# Patient Record
Sex: MALE | Race: WHITE | NOT HISPANIC OR LATINO | Employment: FULL TIME | URBAN - METROPOLITAN AREA
[De-identification: names, ages, dates, MRNs, and addresses within clinical notes are randomized per-mention and may not be internally consistent; named-entity substitution may affect disease eponyms.]

---

## 2018-08-18 ENCOUNTER — APPOINTMENT (OUTPATIENT)
Dept: RADIOLOGY | Facility: MEDICAL CENTER | Age: 48
End: 2018-08-18
Attending: EMERGENCY MEDICINE
Payer: MEDICAID

## 2018-08-18 ENCOUNTER — HOSPITAL ENCOUNTER (EMERGENCY)
Facility: MEDICAL CENTER | Age: 48
End: 2018-08-18
Attending: EMERGENCY MEDICINE
Payer: MEDICAID

## 2018-08-18 VITALS
RESPIRATION RATE: 16 BRPM | HEIGHT: 65 IN | BODY MASS INDEX: 21.6 KG/M2 | OXYGEN SATURATION: 96 % | SYSTOLIC BLOOD PRESSURE: 120 MMHG | DIASTOLIC BLOOD PRESSURE: 77 MMHG | TEMPERATURE: 98.2 F | WEIGHT: 129.63 LBS | HEART RATE: 103 BPM

## 2018-08-18 DIAGNOSIS — S43.102A AC SEPARATION, LEFT, INITIAL ENCOUNTER: ICD-10-CM

## 2018-08-18 DIAGNOSIS — S20.211A CONTUSION OF RIGHT CHEST WALL, INITIAL ENCOUNTER: ICD-10-CM

## 2018-08-18 PROCEDURE — 99283 EMERGENCY DEPT VISIT LOW MDM: CPT

## 2018-08-18 PROCEDURE — 71250 CT THORAX DX C-: CPT

## 2018-08-18 PROCEDURE — 73000 X-RAY EXAM OF COLLAR BONE: CPT | Mod: LT

## 2018-08-18 ASSESSMENT — PAIN SCALES - GENERAL
PAINLEVEL_OUTOF10: 10
PAINLEVEL_OUTOF10: 10

## 2018-08-19 NOTE — DISCHARGE INSTRUCTIONS
"Acromioclavicular Injuries  The AC (acromioclavicular) joint is the joint in the shoulder where the collarbone (clavicle) meets the shoulder blade (scapula). The part of the shoulder blade connected to the collarbone is called the acromion. Common problems with and treatments for the AC joint are detailed below.  ARTHRITIS  Arthritis occurs when the joint has been injured and the smooth padding between the joints (cartilage) is lost. This is the wear and tear seen in most joints of the body if they have been overused. This causes the joint to produce pain and swelling which is worse with activity.   AC JOINT SEPARATION  AC joint separation means that the ligaments connecting the acromion of the shoulder blade and collarbone have been damaged, and the two bones no longer line up. AC separations can be anywhere from mild to severe, and are \"graded\" depending upon which ligaments are torn and how badly they are torn.  · Grade I Injury: the least damage is done, and the AC joint still lines up.  · Grade II Injury: damage to the ligaments which reinforce the AC joint. In a Grade II injury, these ligaments are stretched but not entirely torn. When stressed, the AC joint becomes painful and unstable.  · Grade III Injury: AC and secondary ligaments are completely torn, and the collarbone is no longer attached to the shoulder blade. This results in deformity; a prominence of the end of the clavicle.  AC JOINT FRACTURE  AC joint fracture means that there has been a break in the bones of the AC joint, usually the end of the clavicle.  TREATMENT  TREATMENT OF AC ARTHRITIS  · There is currently no way to replace the cartilage damaged by arthritis. The best way to improve the condition is to decrease the activities which aggravate the problem. Application of ice to the joint helps decrease pain and soreness (inflammation). The use of non-steroidal anti-inflammatory medication is helpful.  · If less conservative measures do not " "work, then cortisone shots (injections) may be used. These are anti-inflammatories; they decrease the soreness in the joint and swelling.  · If non-surgical measures fail, surgery may be recommended. The procedure is generally removal of a portion of the end of the clavicle. This is the part of the collarbone closest to your acromion which is stabilized with ligaments to the acromion of the shoulder blade. This surgery may be performed using a tube-like instrument with a light (arthroscope) for looking into a joint. It may also be performed as an open surgery through a small incision by the surgeon. Most patients will have good range of motion within 6 weeks and may return to all activity including sports by 8-12 weeks, barring complications.  TREATMENT OF AN AC SEPARATION  · The initial treatment is to decrease pain. This is best accomplished by immobilizing the arm in a sling and placing an ice pack to the shoulder for 20 to 30 minutes every 2 hours as needed. As the pain starts to subside, it is important to begin moving the fingers, wrist, elbow and eventually the shoulder in order to prevent a stiff or \"frozen\" shoulder. Instruction on when and how much to move the shoulder will be provided by your caregiver. The length of time needed to regain full motion and function depends on the amount or grade of the injury. Recovery from a Grade I AC separation usually takes 10 to 14 days, whereas a Grade III may take 6 to 8 weeks.  · Grade I and II separations usually do not require surgery. Even Grade III injuries usually allow return to full activity with few restrictions. Treatment is also based on the activity demands of the injured shoulder. For example, a high level quarterback with an injured throwing arm will receive more aggressive treatment than someone with a desk job who rarely uses his/her arm for strenuous activities. In some cases, a painful lump may persist which could require a later surgery. Surgery " can be very successful, but the benefits must be weighed against the potential risks.  TREATMENT OF AN AC JOINT FRACTURE  Fracture treatment depends on the type of fracture. Sometimes a splint or sling may be all that is required. Other times surgery may be required for repair. This is more frequently the case when the ligaments supporting the clavicle are completely torn. Your caregiver will help you with these decisions and together you can decide what will be the best treatment.  HOME CARE INSTRUCTIONS   · Apply ice to the injury for 15-20 minutes each hour while awake for 2 days. Put the ice in a plastic bag and place a towel between the bag of ice and skin.  · If a sling has been applied, wear it constantly for as long as directed by your caregiver, even at night. The sling or splint can be removed for bathing or showering or as directed. Be sure to keep the shoulder in the same place as when the sling is on. Do not lift the arm.  · If a figure-of-eight splint has been applied it should be tightened gently by another person every day. Tighten it enough to keep the shoulders held back. Allow enough room to place the index finger between the body and strap. Loosen the splint immediately if there is numbness or tingling in the hands.  · Take over-the-counter or prescription medicines for pain, discomfort or fever as directed by your caregiver.  · If you or your child has received a follow up appointment, it is very important to keep that appointment in order to avoid long term complications, chronic pain or disability.  SEEK MEDICAL CARE IF:   · The pain is not relieved with medications.  · There is increased swelling or discoloration that continues to get worse rather than better.  · You or your child has been unable to follow up as instructed.  · There is progressive numbness and tingling in the arm, forearm or hand.  SEEK IMMEDIATE MEDICAL CARE IF:   · The arm is numb, cold or pale.  · There is increasing pain  in the hand, forearm or fingers.  MAKE SURE YOU:   · Understand these instructions.  · Will watch your condition.  · Will get help right away if you are not doing well or get worse.     This information is not intended to replace advice given to you by your health care provider. Make sure you discuss any questions you have with your health care provider.     Document Released: 09/27/2006 Document Revised: 03/11/2013 Document Reviewed: 03/22/2010  [employer] Interactive Patient Education ©2016 [employer]      Chest Contusion, Adult  A chest contusion is a deep bruise to the chest. Contusions are usually the result of a blunt injury to tissues under the skin. The injury can damage the small blood vessels under the skin, which causes bleeding under the skin. The skin overlying the contusion may turn blue, purple, or yellow. Minor injuries may give you a painless contusion, but more severe contusions may stay painful and swollen for a few weeks.  What are the causes?  A contusion is usually caused by a hard hit (blow), trauma, or direct force to your chest, such as:  · A motor vehicle accident.  · Falls.  · Bicycle injuries.  · Contact sport injuries.  What increases the risk?  You may be at a higher risk for a chest contusion if you play a sport in which falls and contact are common, such as football or soccer.  What are the signs or symptoms?  Symptoms of this condition include:  · Chest swelling.  · Pain and tenderness of the chest.  · Discomfort with certain movements of the upper torso.  · Discoloration of the chest. The area may have redness and then turn blue, purple, or yellow.  · Discomfort when taking deep breaths.  How is this diagnosed?  A chest contusion is diagnosed from a physical exam and your medical history. An X-ray may be needed to determine if there were any associated injuries, such as broken bones (fractures). Sometimes other tests such as CT scans, ultrasounds, or MRIs may be needed if internal  injuries are suspected.  A test that shows the amount of oxygen in your blood (pulse oximetry) may be done if you have trouble breathing.  How is this treated?  Often, the best treatment for a chest contusion is resting and applying ice to the injured area. Deep-breathing exercises may be recommended to reduce the risk of pneumonia. Oxygen therapy may be given if you have trouble breathing or have low oxygen levels. Over-the-counter medicines may also be recommended for pain control.  Follow these instructions at home:  · If directed, apply ice to the injured area.  ¨ Put ice in a plastic bag.  ¨ Place a towel between your skin and the bag.  ¨ Leave the ice on for 20 minutes, 2-3 times per day.  · Take over-the-counter and prescription medicines only as told by your health care provider.  · Do any deep-breathing exercises as told by your health care provider, if this applies.  · Do not lie down flat on your back. Keep your head and chest raised (elevated) when you are resting or sleeping.  · Do not use any products that contain nicotine or tobacco, such as cigarettes and e-cigarettes. If you need help quitting, ask your health care provider.  · Do not lift anything that causes you discomfort or pain.  Contact a health care provider if:  · Your swelling or pain is not relieved with medicines or treatment.  · You have increased bruising or swelling.  · You have pain that is getting worse.  · Your symptoms have not improved after one week.  Get help right away if:  · You have a sudden, significant increase in pain.  · You have difficulty breathing.  · You have dizziness, weakness, or fainting.  · You have blood in your urine or stool.  · You cough up blood or you vomit blood.  Summary  · A chest contusion is a deep bruise to the chest that is usually caused by a hard hit, trauma, or direct force to your chest.  · Treatment for a chest contusion may include resting and applying ice to the injured area.  · Contact a  health care provider if you have problems breathing or if your pain does not improve with treatment.  This information is not intended to replace advice given to you by your health care provider. Make sure you discuss any questions you have with your health care provider.  Document Released: 09/12/2002 Document Revised: 09/14/2017 Document Reviewed: 09/14/2017  [employer] Interactive Patient Education © 2017 [employer]
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
## 2018-12-12 ENCOUNTER — HOSPITAL ENCOUNTER (EMERGENCY)
Facility: MEDICAL CENTER | Age: 48
End: 2018-12-12
Attending: EMERGENCY MEDICINE
Payer: MEDICAID

## 2018-12-12 VITALS
HEART RATE: 96 BPM | BODY MASS INDEX: 21.63 KG/M2 | OXYGEN SATURATION: 6 % | DIASTOLIC BLOOD PRESSURE: 75 MMHG | TEMPERATURE: 98 F | RESPIRATION RATE: 18 BRPM | WEIGHT: 137.79 LBS | SYSTOLIC BLOOD PRESSURE: 111 MMHG | HEIGHT: 67 IN

## 2018-12-12 DIAGNOSIS — S46.912A SHOULDER STRAIN, LEFT, INITIAL ENCOUNTER: ICD-10-CM

## 2018-12-12 DIAGNOSIS — S43.102A AC SEPARATION, LEFT, INITIAL ENCOUNTER: ICD-10-CM

## 2018-12-12 PROCEDURE — 99283 EMERGENCY DEPT VISIT LOW MDM: CPT

## 2018-12-12 RX ORDER — IBUPROFEN 600 MG/1
600 TABLET ORAL EVERY 6 HOURS PRN
Qty: 30 TAB | Refills: 0 | Status: SHIPPED | OUTPATIENT
Start: 2018-12-12

## 2018-12-12 NOTE — ED TRIAGE NOTES
"Chief Complaint   Patient presents with   • Shoulder Pain     Pt reports chronic left shoulder pain. States that it is becoming more painful and he has a 'lump\" on top of his shoulder now. CMS+  Blood pressure 124/73, pulse (!) 115, temperature 36.3 °C (97.3 °F), temperature source Temporal, resp. rate 20, height 1.702 m (5' 7\"), weight 62.5 kg (137 lb 12.6 oz), SpO2 94 %.    Pt informed of wait times. Educated on triage process.  Asked to return to triage RN for any new or worsening of symptoms. Thanked for patience.        "
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
## 2018-12-13 ENCOUNTER — HOSPITAL ENCOUNTER (EMERGENCY)
Facility: MEDICAL CENTER | Age: 48
End: 2018-12-13
Attending: EMERGENCY MEDICINE
Payer: MEDICAID

## 2018-12-13 VITALS
HEART RATE: 76 BPM | HEIGHT: 65 IN | SYSTOLIC BLOOD PRESSURE: 107 MMHG | DIASTOLIC BLOOD PRESSURE: 72 MMHG | BODY MASS INDEX: 22.99 KG/M2 | WEIGHT: 138.01 LBS | RESPIRATION RATE: 18 BRPM | OXYGEN SATURATION: 95 % | TEMPERATURE: 97 F

## 2018-12-13 DIAGNOSIS — K61.1 PERIRECTAL ABSCESS: ICD-10-CM

## 2018-12-13 PROCEDURE — 700101 HCHG RX REV CODE 250

## 2018-12-13 PROCEDURE — 99284 EMERGENCY DEPT VISIT MOD MDM: CPT

## 2018-12-13 PROCEDURE — 303977 HCHG I & D

## 2018-12-13 PROCEDURE — A9270 NON-COVERED ITEM OR SERVICE: HCPCS | Performed by: EMERGENCY MEDICINE

## 2018-12-13 PROCEDURE — 700102 HCHG RX REV CODE 250 W/ 637 OVERRIDE(OP): Performed by: EMERGENCY MEDICINE

## 2018-12-13 RX ORDER — OXYCODONE HYDROCHLORIDE AND ACETAMINOPHEN 5; 325 MG/1; MG/1
1-2 TABLET ORAL EVERY 4 HOURS PRN
Qty: 15 TAB | Refills: 0 | Status: SHIPPED | OUTPATIENT
Start: 2018-12-13 | End: 2018-12-16

## 2018-12-13 RX ORDER — CEPHALEXIN 500 MG/1
500 CAPSULE ORAL 2 TIMES DAILY
Qty: 14 CAP | Refills: 0 | Status: SHIPPED | OUTPATIENT
Start: 2018-12-13 | End: 2018-12-20

## 2018-12-13 RX ORDER — DOCUSATE SODIUM 100 MG/1
100 CAPSULE, LIQUID FILLED ORAL 2 TIMES DAILY
Qty: 30 CAP | Refills: 0 | Status: SHIPPED | OUTPATIENT
Start: 2018-12-13

## 2018-12-13 RX ORDER — LIDOCAINE HYDROCHLORIDE AND EPINEPHRINE 10; 10 MG/ML; UG/ML
INJECTION, SOLUTION INFILTRATION; PERINEURAL
Status: COMPLETED
Start: 2018-12-13 | End: 2018-12-13

## 2018-12-13 RX ORDER — OXYCODONE AND ACETAMINOPHEN 10; 325 MG/1; MG/1
1 TABLET ORAL ONCE
Status: COMPLETED | OUTPATIENT
Start: 2018-12-13 | End: 2018-12-13

## 2018-12-13 RX ORDER — SULFAMETHOXAZOLE AND TRIMETHOPRIM 800; 160 MG/1; MG/1
1 TABLET ORAL EVERY 12 HOURS
Status: DISCONTINUED | OUTPATIENT
Start: 2018-12-13 | End: 2018-12-13 | Stop reason: HOSPADM

## 2018-12-13 RX ORDER — SULFAMETHOXAZOLE AND TRIMETHOPRIM 800; 160 MG/1; MG/1
1 TABLET ORAL EVERY 12 HOURS
Qty: 14 TAB | Refills: 0 | Status: SHIPPED | OUTPATIENT
Start: 2018-12-13 | End: 2018-12-15

## 2018-12-13 RX ORDER — LIDOCAINE HYDROCHLORIDE AND EPINEPHRINE 10; 10 MG/ML; UG/ML
10 INJECTION, SOLUTION INFILTRATION; PERINEURAL ONCE
Status: COMPLETED | OUTPATIENT
Start: 2018-12-13 | End: 2018-12-13

## 2018-12-13 RX ADMIN — SULFAMETHOXAZOLE AND TRIMETHOPRIM 1 TABLET: 800; 160 TABLET ORAL at 05:16

## 2018-12-13 RX ADMIN — LIDOCAINE HYDROCHLORIDE AND EPINEPHRINE 10 ML: 10; 10 INJECTION, SOLUTION INFILTRATION; PERINEURAL at 04:58

## 2018-12-13 RX ADMIN — OXYCODONE HYDROCHLORIDE AND ACETAMINOPHEN 1 TABLET: 10; 325 TABLET ORAL at 05:16

## 2018-12-13 ASSESSMENT — PAIN SCALES - GENERAL
PAINLEVEL_OUTOF10: 6
PAINLEVEL_OUTOF10: 10

## 2018-12-13 ASSESSMENT — PAIN DESCRIPTION - DESCRIPTORS: DESCRIPTORS: NAGGING;TENDER

## 2018-12-13 ASSESSMENT — LIFESTYLE VARIABLES: DO YOU DRINK ALCOHOL: NO

## 2018-12-13 NOTE — ED TRIAGE NOTES
"Giuseppe Roberts  48 y.o.  male  Chief Complaint   Patient presents with   • Rectal Pain     Present to triage c/o rectal pain x 2-3 days. Possible hemorrhoids, unable to sit down due to pain.states  \" there is something hanging in there \"   "

## 2018-12-13 NOTE — ED NOTES
Pt given discharge instructions and prescriptions, reviewed, all questions answered. Verbalizes understanding for follow up, and to complete antibiotic course. Discussed narcotic script, narcotics agreement signed. VSS. Given work note. Ambulatory to ED exit, steady on feet.

## 2018-12-13 NOTE — ED PROVIDER NOTES
"ED Provider Note    CHIEF COMPLAINT  Chief Complaint   Patient presents with   • Rectal Pain     Seen at 4:41 AM    HPI  Giuseppe Roberts is a 48 y.o. male who presents severe rectal pain.  He believes he has a hemorrhoid.  He denies any bloody bowel movements but had several days of constipation last week that caused him to strain when defecating.  He notes some significant pain on the left side of his anal verge has been present for the past 5 days.  The patient works as a  and has having difficulty working as he has pain with walking and sitting.    He denies any fevers or chills.  He denies any impaired immunity.    REVIEW OF SYSTEMS  See HPI  PAST MEDICAL HISTORY       SOCIAL HISTORY  Social History     Social History Main Topics   • Smoking status: Current Every Day Smoker   • Smokeless tobacco: Never Used   • Alcohol use No   • Drug use: No   • Sexual activity: Not on file       SURGICAL HISTORY  patient denies any surgical history    CURRENT MEDICATIONS  Reviewed.  See Encounter Summary.     ALLERGIES  No Known Allergies    PHYSICAL EXAM  VITAL SIGNS: /72   Pulse 95   Temp 36.1 °C (97 °F) (Temporal)   Resp 18   Ht 1.651 m (5' 5\")   Wt 62.6 kg (138 lb 0.1 oz)   SpO2 95%   BMI 22.97 kg/m²   Constitutional: Awake, alert in no apparent distress.  HENT: Normocephalic, Bilateral external ears normal. Nose normal.   Eyes: Conjunctiva normal, non-icteric, EOMI.    Thorax & Lungs: Easy unlabored respirations  Cardiovascular:    Abdomen:  No distention  Skin: Visualized skin is  Dry, No erythema, No rash.   Rectal exam: There is a small grade 1 hemorrhoid on the right side of the anal verge at the 3 o'clock position.  At the left side of the anal verge the overlying skin is normal however there is some fullness and induration just lateral to the verge with significant tenderness.  The internal rectal exam is normal without any fluctuance.    Bedside ultrasound reveals a 1.5 cm complex hypoechoic " region consistent with abscess.  Extremities:   atraumatic   Neurologic: Alert, Grossly non-focal.   Psychiatric: Affect and Mood normal    Procedure note:   The patient was placed in the right lateral decubitus position, the area of induration was anesthetized using 1% lidocaine with epinephrine.  3 cc were injected into the area of fluctuance.  Following this a stab incision was made using a #12 blade.  The area was probed with a cotton swab.  No loculations were present.  Only a scant amount of dark blood and clear fluid was drained.  This was tolerated fairly poorly and was stopped due to difficulty with pain.  On repeat ultrasound the hypoechoic region was still identified and appears to communicate with the stab incision.    Decision Making:  This is a 48 y.o. year old male who presents with pain and swelling at the anal verge.  There is no sign of a thrombosed hemorrhoid, the patient did have evidence of a hypoechoic region concerning for abscess.  I attempted to drain it and did not get any significant amount of purulence.  The patient did not tolerate a significant amount of probing or manipulation due to pain.  Eventually the procedure was terminated.  The region is quite small and I believe I did aspirate some purulence with the incision and drainage.  Therefore it seems unreasonable to perform conscious sedation for any additional manipulation.  The patient will be treated with Keflex and Bactrim for broad-spectrum coverage.    Given the pain with the procedure, but it would be reasonable to prescribe some opioid analgesia for the next few days.  In prescribing controlled substances to this patient, I certify that I have obtained and reviewed the medical history of Giuseppe Roberts. I have also made a good juan effort to obtain applicable records from other providers who have treated the patient and no other records are available at this time.     I have conducted a physical exam and documented it. I have  reviewed Mr. Roberts’s prescription history as maintained by the [place] Prescription Monitoring Program.     I have assessed the patient’s risk for abuse, dependency, and addiction using the validated Opioid Risk Tool available at https://www.mdcalc.com/plmomk-zskk-hulu-ort-narcotic-abuse.     Given the above, I believe the benefits of controlled substance therapy outweigh the risks. The reasons for prescribing controlled substances include non-narcotic, oral analgesic alternatives have been inadequate for pain control. Accordingly, I have discussed the risk and benefits, treatment plan, and alternative therapies with the patient.     I discussed the possibility of a need to repeat incision and drainage.  Hopefully the procedure performed today will be successful and antibiotics will continue to clear out any infection.  He is directed to return for any worsening swelling/pain or failure to resolve.  I will give him referral for general surgery as well.    dISPOSITION:  Patient will be discharged home in good condition.    Discharge Medications:  New Prescriptions    CEPHALEXIN (KEFLEX) 500 MG CAP    Take 1 Cap by mouth 2 times a day for 7 days.    DOCUSATE SODIUM (COLACE) 100 MG CAP    Take 1 Cap by mouth 2 times a day.    OXYCODONE-ACETAMINOPHEN (PERCOCET) 5-325 MG TAB    Take 1-2 Tabs by mouth every four hours as needed for Severe Pain for up to 3 days.    SULFAMETHOXAZOLE-TRIMETHOPRIM (BACTRIM DS) 800-160 MG TABLET    Take 1 Tab by mouth every 12 hours for 7 days.       The patient was discharged home (see d/c instructions) and told to return immediately for any signs or symptoms listed, or any worsening at all.  The patient verbally agreed to the discharge precautions and follow-up plan which is documented in EPIC.    The patient's blood pressure is elevated today. >120/80. I have referred them to primary care for follow up.         FINAL IMPRESSION  1. Perirectal abscess

## 2018-12-15 ENCOUNTER — APPOINTMENT (OUTPATIENT)
Dept: RADIOLOGY | Facility: MEDICAL CENTER | Age: 48
End: 2018-12-15
Attending: EMERGENCY MEDICINE
Payer: MEDICAID

## 2018-12-15 ENCOUNTER — HOSPITAL ENCOUNTER (EMERGENCY)
Facility: MEDICAL CENTER | Age: 48
End: 2018-12-15
Attending: EMERGENCY MEDICINE
Payer: MEDICAID

## 2018-12-15 VITALS
RESPIRATION RATE: 20 BRPM | DIASTOLIC BLOOD PRESSURE: 79 MMHG | HEIGHT: 65 IN | BODY MASS INDEX: 22.96 KG/M2 | TEMPERATURE: 97.7 F | OXYGEN SATURATION: 94 % | HEART RATE: 88 BPM | WEIGHT: 137.79 LBS | SYSTOLIC BLOOD PRESSURE: 124 MMHG

## 2018-12-15 DIAGNOSIS — K61.0 PERIANAL ABSCESS: ICD-10-CM

## 2018-12-15 LAB
ALBUMIN SERPL BCP-MCNC: 4.2 G/DL (ref 3.2–4.9)
ALBUMIN/GLOB SERPL: 1.4 G/DL
ALP SERPL-CCNC: 95 U/L (ref 30–99)
ALT SERPL-CCNC: 13 U/L (ref 2–50)
ANION GAP SERPL CALC-SCNC: 6 MMOL/L (ref 0–11.9)
AST SERPL-CCNC: 12 U/L (ref 12–45)
BASOPHILS # BLD AUTO: 0.3 % (ref 0–1.8)
BASOPHILS # BLD: 0.04 K/UL (ref 0–0.12)
BILIRUB SERPL-MCNC: 0.4 MG/DL (ref 0.1–1.5)
BUN SERPL-MCNC: 12 MG/DL (ref 8–22)
CALCIUM SERPL-MCNC: 9 MG/DL (ref 8.5–10.5)
CHLORIDE SERPL-SCNC: 104 MMOL/L (ref 96–112)
CO2 SERPL-SCNC: 25 MMOL/L (ref 20–33)
CREAT SERPL-MCNC: 0.84 MG/DL (ref 0.5–1.4)
EOSINOPHIL # BLD AUTO: 0.09 K/UL (ref 0–0.51)
EOSINOPHIL NFR BLD: 0.6 % (ref 0–6.9)
ERYTHROCYTE [DISTWIDTH] IN BLOOD BY AUTOMATED COUNT: 45.1 FL (ref 35.9–50)
GLOBULIN SER CALC-MCNC: 3.1 G/DL (ref 1.9–3.5)
GLUCOSE SERPL-MCNC: 92 MG/DL (ref 65–99)
GRAM STN SPEC: NORMAL
HCT VFR BLD AUTO: 42.5 % (ref 42–52)
HGB BLD-MCNC: 14.3 G/DL (ref 14–18)
IMM GRANULOCYTES # BLD AUTO: 0.05 K/UL (ref 0–0.11)
IMM GRANULOCYTES NFR BLD AUTO: 0.4 % (ref 0–0.9)
LYMPHOCYTES # BLD AUTO: 1.42 K/UL (ref 1–4.8)
LYMPHOCYTES NFR BLD: 10.1 % (ref 22–41)
MCH RBC QN AUTO: 32.6 PG (ref 27–33)
MCHC RBC AUTO-ENTMCNC: 33.6 G/DL (ref 33.7–35.3)
MCV RBC AUTO: 96.8 FL (ref 81.4–97.8)
MONOCYTES # BLD AUTO: 0.91 K/UL (ref 0–0.85)
MONOCYTES NFR BLD AUTO: 6.5 % (ref 0–13.4)
NEUTROPHILS # BLD AUTO: 11.5 K/UL (ref 1.82–7.42)
NEUTROPHILS NFR BLD: 82.1 % (ref 44–72)
NRBC # BLD AUTO: 0 K/UL
NRBC BLD-RTO: 0 /100 WBC
PLATELET # BLD AUTO: 317 K/UL (ref 164–446)
PMV BLD AUTO: 9.3 FL (ref 9–12.9)
POTASSIUM SERPL-SCNC: 4 MMOL/L (ref 3.6–5.5)
PROT SERPL-MCNC: 7.3 G/DL (ref 6–8.2)
RBC # BLD AUTO: 4.39 M/UL (ref 4.7–6.1)
SIGNIFICANT IND 70042: NORMAL
SITE SITE: NORMAL
SODIUM SERPL-SCNC: 135 MMOL/L (ref 135–145)
SOURCE SOURCE: NORMAL
WBC # BLD AUTO: 14 K/UL (ref 4.8–10.8)

## 2018-12-15 PROCEDURE — 87077 CULTURE AEROBIC IDENTIFY: CPT

## 2018-12-15 PROCEDURE — 99285 EMERGENCY DEPT VISIT HI MDM: CPT

## 2018-12-15 PROCEDURE — 87070 CULTURE OTHR SPECIMN AEROBIC: CPT

## 2018-12-15 PROCEDURE — 700117 HCHG RX CONTRAST REV CODE 255: Performed by: EMERGENCY MEDICINE

## 2018-12-15 PROCEDURE — 700111 HCHG RX REV CODE 636 W/ 250 OVERRIDE (IP): Performed by: EMERGENCY MEDICINE

## 2018-12-15 PROCEDURE — 700101 HCHG RX REV CODE 250

## 2018-12-15 PROCEDURE — 700101 HCHG RX REV CODE 250: Performed by: EMERGENCY MEDICINE

## 2018-12-15 PROCEDURE — 87186 SC STD MICRODIL/AGAR DIL: CPT

## 2018-12-15 PROCEDURE — 303977 HCHG I & D

## 2018-12-15 PROCEDURE — 80053 COMPREHEN METABOLIC PANEL: CPT

## 2018-12-15 PROCEDURE — 87205 SMEAR GRAM STAIN: CPT

## 2018-12-15 PROCEDURE — 85025 COMPLETE CBC W/AUTO DIFF WBC: CPT

## 2018-12-15 PROCEDURE — 72193 CT PELVIS W/DYE: CPT

## 2018-12-15 PROCEDURE — 96374 THER/PROPH/DIAG INJ IV PUSH: CPT | Mod: XU

## 2018-12-15 PROCEDURE — 96375 TX/PRO/DX INJ NEW DRUG ADDON: CPT | Mod: XU

## 2018-12-15 RX ORDER — KETAMINE HYDROCHLORIDE 50 MG/ML
2 INJECTION, SOLUTION INTRAMUSCULAR; INTRAVENOUS ONCE
Status: COMPLETED | OUTPATIENT
Start: 2018-12-15 | End: 2018-12-15

## 2018-12-15 RX ORDER — LIDOCAINE HYDROCHLORIDE 10 MG/ML
5 INJECTION, SOLUTION INFILTRATION; PERINEURAL ONCE
Status: COMPLETED | OUTPATIENT
Start: 2018-12-15 | End: 2018-12-15

## 2018-12-15 RX ORDER — SULFAMETHOXAZOLE AND TRIMETHOPRIM 800; 160 MG/1; MG/1
1 TABLET ORAL 2 TIMES DAILY
Qty: 10 TAB | Refills: 0 | Status: SHIPPED | OUTPATIENT
Start: 2018-12-15 | End: 2018-12-20

## 2018-12-15 RX ORDER — AMOXICILLIN AND CLAVULANATE POTASSIUM 875; 125 MG/1; MG/1
1 TABLET, FILM COATED ORAL 2 TIMES DAILY
Qty: 14 TAB | Refills: 0 | Status: SHIPPED | OUTPATIENT
Start: 2018-12-15 | End: 2018-12-22

## 2018-12-15 RX ORDER — MORPHINE SULFATE 10 MG/ML
4 INJECTION, SOLUTION INTRAMUSCULAR; INTRAVENOUS ONCE
Status: COMPLETED | OUTPATIENT
Start: 2018-12-15 | End: 2018-12-15

## 2018-12-15 RX ORDER — LIDOCAINE HYDROCHLORIDE 10 MG/ML
INJECTION, SOLUTION INFILTRATION; PERINEURAL
Status: COMPLETED
Start: 2018-12-15 | End: 2018-12-15

## 2018-12-15 RX ADMIN — MORPHINE SULFATE 4 MG: 10 INJECTION INTRAVENOUS at 09:27

## 2018-12-15 RX ADMIN — LIDOCAINE HYDROCHLORIDE 5 ML: 10 INJECTION, SOLUTION INFILTRATION; PERINEURAL at 12:30

## 2018-12-15 RX ADMIN — IOHEXOL 100 ML: 350 INJECTION, SOLUTION INTRAVENOUS at 10:55

## 2018-12-15 RX ADMIN — KETAMINE HYDROCHLORIDE 125 MG: 50 INJECTION INTRAMUSCULAR; INTRAVENOUS at 12:25

## 2018-12-15 ASSESSMENT — ENCOUNTER SYMPTOMS
SHORTNESS OF BREATH: 0
VOMITING: 1
NAUSEA: 1
FALLS: 0
FEVER: 0
HEADACHES: 0
CHILLS: 1

## 2018-12-15 NOTE — ED TRIAGE NOTES
Pt comes in complaining a perirectal abscess. Pt reporting symptoms are worth then they were when he was seen here.

## 2018-12-15 NOTE — ED PROVIDER NOTES
"ED Provider Note    ED Provider Note    Primary care provider: Pcp Pt States None  Means of arrival: POV  History obtained from: Patent  History limited by: None    CHIEF COMPLAINT  Chief Complaint   Patient presents with   • Abscess       HPI  Giuseppe Morales is a 48 y.o. male who presents to the Emergency Department with a chief complaint of an abscess to his left perianal area.  Patient states he was here a few days ago and had the area incised and drained but it is only gotten worse.  He does not think he had a fever although he has had chills.  He had one episode of nausea and vomiting this morning.  He was prescribed antibiotics and missed today but otherwise has been taking them.  He denies any other past medical history.  He is having significant pain especially with sitting and having bowel movements.  He is on a stool softener.    REVIEW OF SYSTEMS  Review of Systems   Constitutional: Positive for chills. Negative for fever.   Respiratory: Negative for shortness of breath.    Cardiovascular: Negative for chest pain.   Gastrointestinal: Positive for nausea and vomiting.   Musculoskeletal: Negative for falls.   Neurological: Negative for headaches.   All other systems reviewed and are negative.      PAST MEDICAL HISTORY       SURGICAL HISTORY  patient denies any surgical history    SOCIAL HISTORY  Social History   Substance Use Topics   • Smoking status: Current Every Day Smoker   • Smokeless tobacco: Never Used   • Alcohol use No      History   Drug Use No       FAMILY HISTORY  No family history on file.    CURRENT MEDICATIONS  Home Medications    **Home medications have not yet been reviewed for this encounter**         ALLERGIES  No Known Allergies    PHYSICAL EXAM  VITAL SIGNS: /79   Pulse 88   Temp 36.5 °C (97.7 °F) (Temporal)   Resp 20   Ht 1.651 m (5' 5\")   Wt 62.5 kg (137 lb 12.6 oz)   SpO2 94%   BMI 22.93 kg/m²   Vitals reviewed.  Constitutional: Patient is oriented to person, place, and " time. Appears well-developed and well-nourished.  Mild distress.    Head: Normocephalic and atraumatic.   Mouth/Throat: Oropharynx is clear and moist  Eyes: Conjunctivae are normal.   Neck: Normal range of motion.  Cardiovascular: Normal rate, regular rhythm and normal heart sounds. Normal peripheral pulses in the bilateral upper extremities.  Pulmonary/Chest: Effort normal and breath sounds normal. No respiratory distress, no wheezes, rhonchi, or rales.  Abdominal: Soft. Bowel sounds are normal. There is no tenderness. No rebound or guarding, or peritoneal signs.   Rectal: On the left side of the patient's perianal area, there is swelling, induration and tenderness to palpation.  There is an area of fluctuance and some mild purulent drainage.  This area of induration, erythema covers approximately 5 cm.  Musculoskeletal: No edema and no tenderness.   Neurological: No focal deficits.   Skin: Skin is warm and dry. No erythema. No pallor.   Psychiatric: Patient has a normal mood and affect.     LABS  Results for orders placed or performed during the hospital encounter of 12/15/18   CBC WITH DIFFERENTIAL   Result Value Ref Range    WBC 14.0 (H) 4.8 - 10.8 K/uL    RBC 4.39 (L) 4.70 - 6.10 M/uL    Hemoglobin 14.3 14.0 - 18.0 g/dL    Hematocrit 42.5 42.0 - 52.0 %    MCV 96.8 81.4 - 97.8 fL    MCH 32.6 27.0 - 33.0 pg    MCHC 33.6 (L) 33.7 - 35.3 g/dL    RDW 45.1 35.9 - 50.0 fL    Platelet Count 317 164 - 446 K/uL    MPV 9.3 9.0 - 12.9 fL    Neutrophils-Polys 82.10 (H) 44.00 - 72.00 %    Lymphocytes 10.10 (L) 22.00 - 41.00 %    Monocytes 6.50 0.00 - 13.40 %    Eosinophils 0.60 0.00 - 6.90 %    Basophils 0.30 0.00 - 1.80 %    Immature Granulocytes 0.40 0.00 - 0.90 %    Nucleated RBC 0.00 /100 WBC    Neutrophils (Absolute) 11.50 (H) 1.82 - 7.42 K/uL    Lymphs (Absolute) 1.42 1.00 - 4.80 K/uL    Monos (Absolute) 0.91 (H) 0.00 - 0.85 K/uL    Eos (Absolute) 0.09 0.00 - 0.51 K/uL    Baso (Absolute) 0.04 0.00 - 0.12 K/uL     Immature Granulocytes (abs) 0.05 0.00 - 0.11 K/uL    NRBC (Absolute) 0.00 K/uL   COMP METABOLIC PANEL   Result Value Ref Range    Sodium 135 135 - 145 mmol/L    Potassium 4.0 3.6 - 5.5 mmol/L    Chloride 104 96 - 112 mmol/L    Co2 25 20 - 33 mmol/L    Anion Gap 6.0 0.0 - 11.9    Glucose 92 65 - 99 mg/dL    Bun 12 8 - 22 mg/dL    Creatinine 0.84 0.50 - 1.40 mg/dL    Calcium 9.0 8.5 - 10.5 mg/dL    AST(SGOT) 12 12 - 45 U/L    ALT(SGPT) 13 2 - 50 U/L    Alkaline Phosphatase 95 30 - 99 U/L    Total Bilirubin 0.4 0.1 - 1.5 mg/dL    Albumin 4.2 3.2 - 4.9 g/dL    Total Protein 7.3 6.0 - 8.2 g/dL    Globulin 3.1 1.9 - 3.5 g/dL    A-G Ratio 1.4 g/dL   ESTIMATED GFR   Result Value Ref Range    GFR If African American >60 >60 mL/min/1.73 m 2    GFR If Non African American >60 >60 mL/min/1.73 m 2       All labs reviewed by me.      RADIOLOGY  CT-PELVIS WITH   Final Result      1.  Left perianal mass suspicious for left perianal abscess measuring 5.4 x 3.4 x 3.0 cm.      2.  No evidence of intrapelvic or retroperitoneal extension.        The radiologist's interpretation of all radiological studies have been reviewed by me.    Conscious Sedation Procedure    Indication: Abscess incision and drainage    Consent: I have discussed with the patient and/or the patient representative the indication, alternatives, and the possible risks and/or complications of the planned procedure and the anesthesia methods. The patient and/or patient representative appear to understand and agree to proceed.    Physician Involvement: The attending physician was present and supervising this procedure.    Pre-Sedation Documentation and Exam: I have personally completed a history, physical exam & review of systems for this patient (see notes).  Airway Assessment: normal neck range of motion    Prior History of Anesthesia Complications: none    ASA Classification: Class 1 - A normal healthy patient    Sedation/ Anesthesia Plan: intravenous  sedation    Medications Used: ketamine intravenously    Monitoring and Safety: The patient was placed on a cardiac monitor and vital signs, pulse oximetry and level of consciousness were continuously evaluated throughout the procedure. The patient was closely monitored until recovery from the medications was complete and the patient had returned to baseline status. Respiratory therapy was on standby at all times during the procedure.    Post-Sedation Vital Signs: Vital signs were reviewed and were stable after the procedure (see flow sheet for vitals)            Post-Sedation Exam: Lungs: clear           Complications: none      Incision and Drainage Procedure    Indication: Abscess    Location: Perianal, left    Procedure: The patient was positioned appropriately and the skin over the incision site was prepped with betadine and draped in a sterile fashion and prepped with betadine. Local anesthesia was obtained by infiltration using 1% Lidocaine without epinephrine.  An incision was then made over the apex of the lesion and approximately 5 cc of thick, foul smelling, purulent and straw colored material was expressed. Loculations were broken up using forceps and more of the material was able to be expressed. The drainage cavity was then irrigated. The patient’s tetanus status was up to date and did not require a booster dose.    The patient tolerated the procedure well.    Complications: None      COURSE & MEDICAL DECISION MAKING  Pertinent Labs & Imaging studies reviewed. (See chart for details)    Obtained and reviewed past medical records.  Patient's encounter from December 13 reviewed.    12:44 PM - Patient seen and examined at bedside.  This is a pleasant 48-year-old male who presents with a forrest-anal abscess, concern for possible perirectal abscess or fistula formation or tracking of infection.  I have recommended further evaluation with CT to which she is agreeable to.  He will be kept n.p.o.  He is treated  with pain medication.  IV started labs drawn per nursing protocols.      I have reevaluated the patient at the bedside.  We discussed CT findings consistent with a limited perianal abscess.  No extension no fistula formation.  I think this is something that is amenable to incision and drain here in the emergency department.  Patient is very tender however, I do not think he cannot tolerate this without procedural sedation.  He is agreeable to this.    Please see note above for abscess I&D details and procedural sedation.  Patient tolerated well.    1:46 PM patient's reexamined after procedural sedation.  He is awake, alert, feeling much better.  I discussed with him a change in his antibiotic based on location of the abscess.  He will be discharged home on Augmentin.  He is living in a motor home.  He does not have access to a bath tub.  I have recommended that he use a warm washcloth to help dilate and bring blood flow to this area.  He is advised on recheck.  At this time, he is well-appearing and nontoxic with normal vital signs and I anticipate discharge to home.    FINAL IMPRESSION  1. Perianal abscess    Status post incision and drainage

## 2018-12-15 NOTE — LETTER
12/17/2018               Giuseppe Morales  [address]  [place] [address]        Dear Giuseppe (MR#[id number])    This letter is sent in regards to your, recent visit to the [facility] Emergency Department on 12/15/2018.  During the visit, tests were performed to assist the physician in a medical diagnosis.  A review of those tests requires that we notify you of the following:    Your wound culture was POSITIVE for a bacteria called Methicillin Resistant Staphylococcus aureus (MRSA). The antibiotic prescribed for you (sulfamethoxazole-trimethoprim) should be active to treat this bacteria. IT IS IMPORTANT THAT YOU CONTINUE TAKING YOUR ANTIBIOTIC UNTIL IT IS FINISHED.      Please feel free to contact me at the number below if you have any questions or concerns. Thank you for your cooperation in the matter.    Sincerely,  ED Culture Follow-Up Staff  Ada Guido, PharmD    [facility], Emergency Department  [address] [place] [address] [id number] [phone] (ED Culture Line)  [phone]

## 2018-12-17 LAB
BACTERIA WND AEROBE CULT: ABNORMAL
BACTERIA WND AEROBE CULT: ABNORMAL
GRAM STN SPEC: ABNORMAL
SIGNIFICANT IND 70042: ABNORMAL
SITE SITE: ABNORMAL
SOURCE SOURCE: ABNORMAL

## 2018-12-18 NOTE — ED NOTES
ED Positive Culture Follow-up/Notification Note:    Date: 12/17/18     Patient seen in the ED on 12/15/2018 for abscess of left perianal area. Area was I&D days prior, but worsened.   1. Perianal abscess       Discharge Medication List as of 12/15/2018  2:33 PM      START taking these medications    Details   !! sulfamethoxazole-trimethoprim (BACTRIM DS) 800-160 MG tablet Take 1 Tab by mouth 2 times a day for 5 days., Disp-10 Tab, R-0, Print Rx Paper      amoxicillin-clavulanate (AUGMENTIN) 875-125 MG Tab Take 1 Tab by mouth 2 times a day for 7 days., Disp-14 Tab, R-0, Print Rx Paper       !! - Potential duplicate medications found. Please discuss with provider.          Allergies: Patient has no known allergies.     Vitals:    12/15/18 1300 12/15/18 1313 12/15/18 1315 12/15/18 1330   BP:       Pulse: (!) 102 98 93 88   Resp:       Temp:       TempSrc:       SpO2: 100% 99% 100% 94%   Weight:       Height:           Final cultures:   Results     Procedure Component Value Units Date/Time    CULTURE WOUND W/ GRAM STAIN [[id number]]  (Abnormal)  (Susceptibility) Collected:  12/15/18 1250    Order Status:  Completed Specimen:  Wound from Perianal Updated:  12/17/18 1105     Significant Indicator POS (POS)     Source WND     Site PERIANAL     Culture Result Wound Light growth mixed enteric reva. (A)     Gram Stain Result Moderate WBCs.  Moderate Gram positive cocci.  Moderate Gram positive rods.  Few Gram negative rods.       Culture Result Wound Methicillin Resistant Staphylococcus aureus  Moderate growth   (A)    Narrative:       CALL  [place]  ER tel. ,  CALLED  ER tel.  12/17/2018, 11:04, RB PERF. RESULTS CALLED TO:[phone]    Culture & Susceptibility     METHICILLIN RESISTANT STAPHYLOCOCCUS AUREUS     Antibiotic Sensitivity Microscan Unit Status    Ampicillin/sulbactam Resistant >16/8 mcg/mL Final    Method: SENSITIVITY, DEX    Clindamycin Sensitive <=0.5 mcg/mL Final    Method: SENSITIVITY, DEX    Daptomycin Sensitive 1  mcg/mL Final    Method: SENSITIVITY, DEX    Erythromycin Sensitive <=0.5 mcg/mL Final    Method: SENSITIVITY, DEX    Moxifloxacin Sensitive <=0.5 mcg/mL Final    Method: SENSITIVITY, DEX    Oxacillin Resistant >2 mcg/mL Final    Method: SENSITIVITY, DEX    Penicillin Resistant >8 mcg/mL Final    Method: SENSITIVITY, DEX    Tetracycline Sensitive <=4 mcg/mL Final    Method: SENSITIVITY, DEX    Trimeth/Sulfa Sensitive <=0.5/9.5 mcg/mL Final    Method: SENSITIVITY, DEX    Vancomycin Sensitive 1 mcg/mL Final    Method: SENSITIVITY, DEX                       GRAM STAIN [[id number]] Collected:  12/15/18 1250    Order Status:  Completed Specimen:  Wound Updated:  12/15/18 2120     Significant Indicator .     Source WND     Site PERIANAL     Gram Stain Result Moderate WBCs.  Moderate Gram positive cocci.  Moderate Gram positive rods.  Few Gram negative rods.            Plan:   Appropriate antibiotic therapy prescribed. No changes required based upon culture result.  Sent letter to patient to notify of positive culture result and encourage compliance with prescribed antibiotics.     Ada Guido

## 2019-08-16 ENCOUNTER — HOSPITAL ENCOUNTER (EMERGENCY)
Facility: MEDICAL CENTER | Age: 49
End: 2019-08-16
Attending: EMERGENCY MEDICINE
Payer: MEDICAID

## 2019-08-16 VITALS
BODY MASS INDEX: 25.12 KG/M2 | RESPIRATION RATE: 20 BRPM | TEMPERATURE: 97.2 F | DIASTOLIC BLOOD PRESSURE: 73 MMHG | HEIGHT: 65 IN | WEIGHT: 150.79 LBS | SYSTOLIC BLOOD PRESSURE: 148 MMHG | OXYGEN SATURATION: 98 % | HEART RATE: 81 BPM

## 2019-08-16 PROCEDURE — 302449 STATCHG TRIAGE ONLY (STATISTIC)

## 2019-08-16 NOTE — ED PROVIDER NOTES
ED Provider Note    Scribed for Robyn Amaya M.D. by Shivani Méndez. 8/16/2019, 7:53 AM.      7:53 AM Patient was not in the room upon arrival for medical examination. He was not examined by me and appears to have eloped prior to evaluation.      IShivani (Scribe), am scribing for, and in the presence of, Robyn Amaya MD.    Electronically signed by: Shivani Méndez (Scribe), 8/16/2019    Robyn BARAKAT MD found personally performed the services described in this documentation, as scribed by Shivani Méndez in my presence, and it is both accurate and complete.    The note accurately reflects work and decisions made by me.  Robyn Amaya  8/16/2019  2:06 PM

## 2019-08-16 NOTE — ED TRIAGE NOTES
"Giuseppe Morales  49 y.o. male  Chief Complaint   Patient presents with   • Jaw Pain     Pt reports right upper dental/jaw pain starting a couple weeks ago, worsening the past day. Pain currently rated 10/10   • Dental Pain     Pt ambulated to triage with steady gait for above complaint.     Pt back to lobby. Educated to inform staff of any concerns or changes.     Blood Pressure: 148/73, Pulse: 81, Respiration: 20, Temperature: 36.2 °C (97.2 °F), Height: 165.1 cm (5' 5\"), Weight: 68.4 kg (150 lb 12.7 oz), Pulse Oximetry: 98 %, O2 Delivery: None (Room Air)    "

## 2023-08-23 ENCOUNTER — HOSPITAL ENCOUNTER (EMERGENCY)
Facility: MEDICAL CENTER | Age: 53
End: 2023-08-23
Payer: MEDICAID

## 2023-08-23 VITALS
TEMPERATURE: 96.9 F | BODY MASS INDEX: 24.13 KG/M2 | HEART RATE: 112 BPM | RESPIRATION RATE: 20 BRPM | HEIGHT: 65 IN | SYSTOLIC BLOOD PRESSURE: 119 MMHG | OXYGEN SATURATION: 95 % | WEIGHT: 144.84 LBS | DIASTOLIC BLOOD PRESSURE: 70 MMHG

## 2023-08-23 PROCEDURE — 302449 STATCHG TRIAGE ONLY (STATISTIC)

## 2023-08-23 NOTE — ED NOTES
Called for patient in main ed lobby, phlebotomy area, and mens restroom, no response. Will try again in a few minutes.

## 2023-08-23 NOTE — ED TRIAGE NOTES
Ambulatory to triage with   Chief Complaint   Patient presents with    Urinary Retention     Has not voided since yesterday at 0400     Unable to initiate a steam. 10/10 bladder pressure and pain. No past hx of BPH or urinary retention.